# Patient Record
Sex: MALE | Race: WHITE | NOT HISPANIC OR LATINO | Employment: FULL TIME | URBAN - METROPOLITAN AREA
[De-identification: names, ages, dates, MRNs, and addresses within clinical notes are randomized per-mention and may not be internally consistent; named-entity substitution may affect disease eponyms.]

---

## 2024-10-03 ENCOUNTER — APPOINTMENT (OUTPATIENT)
Dept: ULTRASOUND IMAGING | Facility: HOSPITAL | Age: 44
End: 2024-10-03
Payer: COMMERCIAL

## 2024-10-03 ENCOUNTER — APPOINTMENT (OUTPATIENT)
Dept: GENERAL RADIOLOGY | Facility: HOSPITAL | Age: 44
End: 2024-10-03
Payer: COMMERCIAL

## 2024-10-03 ENCOUNTER — HOSPITAL ENCOUNTER (EMERGENCY)
Facility: HOSPITAL | Age: 44
Discharge: HOME | End: 2024-10-03
Attending: EMERGENCY MEDICINE
Payer: COMMERCIAL

## 2024-10-03 VITALS
TEMPERATURE: 98 F | RESPIRATION RATE: 20 BRPM | HEIGHT: 68 IN | WEIGHT: 184.6 LBS | OXYGEN SATURATION: 97 % | DIASTOLIC BLOOD PRESSURE: 92 MMHG | SYSTOLIC BLOOD PRESSURE: 140 MMHG | HEART RATE: 70 BPM | BODY MASS INDEX: 27.98 KG/M2

## 2024-10-03 DIAGNOSIS — M25.571 PAIN AND SWELLING OF RIGHT ANKLE: Primary | ICD-10-CM

## 2024-10-03 DIAGNOSIS — M25.471 PAIN AND SWELLING OF RIGHT ANKLE: Primary | ICD-10-CM

## 2024-10-03 PROCEDURE — 93971 EXTREMITY STUDY: CPT

## 2024-10-03 PROCEDURE — 99284 EMERGENCY DEPT VISIT MOD MDM: CPT

## 2024-10-03 PROCEDURE — 99283 EMERGENCY DEPT VISIT LOW MDM: CPT

## 2024-10-03 PROCEDURE — 73610 X-RAY EXAM OF ANKLE: CPT

## 2024-10-03 NOTE — Clinical Note
[facility] [place] [address] [place] [address] [place] [address]  Phone: [phone]    Salvador Ross was seen and treated in our emergency department on 10/3/2024.  He may return to work on 10/05/2024.         Thank you for choosing [facility].    Rema Morris APRN

## 2024-10-03 NOTE — FSED PROVIDER NOTE
[facility]    EMERGENCY [facility]PARTMENT ENCOUNTER    Room Number:  11/11  Date seen:  10/3/2024  Time seen: 16:46 EDT  PCP: Provider, No Known  Historian: patient    HPI:  Chief complaint:ankle swelling  Context:Salvador Ross is a 44 y.o. male who presents to the ED with c/o ankle swelling. The patient states that he has been having ankle pain and swelling since Tuesday. He states that the swelling as persisted and he was concerned about having a blood clot. The patient denies any numbness or tingling.  He reports that he did recently travel to [place] and back over the a couple of days.  He reports that one of his family members is a nurse practitioner and told him that he should be evaluated.  He denies any chest pain, shortness of breath.  Patient is able to ambulate without difficulty.    Timing: Constant  Duration: Tuesday  Location: Right ankle  Intensity/Severity: Moderate  Associated Symptoms: Right ankle pain and swelling    MEDICAL RECORD REVIEW  No chronic medical history reported    ALLERGIES  Patient has no known allergies.    PAST MEDICAL HISTORY  Active Ambulatory Problems     Diagnosis Date Noted    No Active Ambulatory Problems     Resolved Ambulatory Problems     Diagnosis Date Noted    No Resolved Ambulatory Problems     No Additional Past Medical History       PAST SURGICAL HISTORY  History reviewed. No pertinent surgical history.    FAMILY HISTORY  History reviewed. No pertinent family history.    SOCIAL HISTORY  Social History     Socioeconomic History    Marital status:        REVIEW OF SYSTEMS  Review of Systems    All systems reviewed and negative except for those discussed in HPI.     PHYSICAL EXAM    I have reviewed the triage vital signs and nursing notes.    ED Triage Vitals [10/03/24 1443]   Temp Heart Rate Resp BP SpO2   98 °F (36.7 °C) 85 18 (!) 167/102 97 %      Temp src Heart Rate Source Patient Position BP Location FiO2 (%)   -- -- -- --  --       Physical Exam  Constitutional:       Appearance: Normal appearance. He is not toxic-appearing.   HENT:      Nose: Nose normal.      Mouth/Throat:      Mouth: Mucous membranes are moist.   Cardiovascular:      Rate and Rhythm: Normal rate and regular rhythm.      Pulses: Normal pulses.      Heart sounds: Normal heart sounds.   Pulmonary:      Effort: Pulmonary effort is normal.      Breath sounds: Normal breath sounds.   Musculoskeletal:      Right ankle: Swelling present. Tenderness present. Normal range of motion. Normal pulse.      Right Achilles Tendon: Normal.      Right foot: Normal range of motion and normal capillary refill. Swelling present. No tenderness or bony tenderness. Normal pulse.        Legs:    Skin:     General: Skin is warm.      Capillary Refill: Capillary refill takes less than 2 seconds.   Neurological:      General: No focal deficit present.      Mental Status: He is alert.   Psychiatric:         Mood and Affect: Mood normal.         Behavior: Behavior normal.         Vital signs and nursing notes reviewed.        LAB RESULTS  No results found for this or any previous visit (from the past 24 hour(s)).    Ordered the above labs and independently reviewed the results.      RADIOLOGY RESULTS  US Venous Doppler Lower Extremity Right (duplex)    Result Date: 10/3/2024  US VENOUS DOPPLER LOWER EXTREMITY RIGHT (DUPLEX) Date of Exam: 10/3/2024 4:58 PM EDT Indication: left ankle swelling. Comparison: None available. Technique:  Routine gray scale, color flow and spectral Doppler analysis of the right lower extremity. A complete venous study was performed with image documentation obtained per protocol. Findings: Lower extremity deep veins including the common femoral vein, femoral vein, proximal deep femoral vein, popliteal vein, and visualized calf veins demonstrate normal flow, compressibility, and augmentation.   No evidence of luminal echogenic thrombus.     Impression: No evidence of  right lower extremity DVT Electronically Signed: Alber Lan MD  10/3/2024 5:02 PM EDT  Workstation ID: [id number]    XR Ankle 3+ View Right    Result Date: 10/3/2024  XR ANKLE 3+ VW RIGHT Date of Exam: 10/3/2024 3:13 PM EDT Indication: no trauma, increasing pain and swelling x2 days. Comparison: None available. Findings: There is no evidence of fracture or dislocation. No focal lesions identified. No erosions. Joint spaces are maintained. No focal soft tissue abnormalities identified.     Impression: No acute abnormality. Electronically Signed: Connor Godinez DO  10/3/2024 3:30 PM EDT  Workstation ID: [id number]        I ordered the above noted radiological studies. Independently reviewed by me and discussed with radiologist.  See dictation above for official radiology interpretation.      Orders placed during this visit:  Orders Placed This Encounter   Procedures    XR Ankle 3+ View Right    US Venous Doppler Lower Extremity Right (duplex)    Apply ace wrap    Recheck blood pressure  Nursing Communication    ED Acknowledgement Form Needed;           PROCEDURES    Procedures        MEDICATIONS GIVEN IN ER    Medications - No data to display      PROGRESS, DATA ANALYSIS, CONSULTS, AND MEDICAL DECISION MAKING    All labs and radiology studies have been independently reviewed by me.          AS OF 17:19 EDT VITALS:    BP - 140/92  HR - 70  TEMP - 98 °F (36.7 °C)  02 SATS - 97%    Medical Decision Making  MEDICAL DECISION  Patient is a 44-year-old male who presents today with right ankle pain and swelling.  DDX includes chronic venous stasis changes, lymphedema, fracture or trauma, MSK pain, DVT, cellulitis and other nonemergent causes of leg swelling. Doubt atypical presentation of CHF or other volume overload states. PE is low on the differential due to normal vital signs without symptoms. Low suspicion for constitutional infection or metabolic derangements. Patient was placed in an ace wrap and given discharge  instructions. Recommend follow up wit pcp and/or orthopedic. Return precautions given with understanding.      Problems Addressed:  Pain and swelling of right ankle: complicated acute illness or injury    Amount and/or Complexity of Data Reviewed  Radiology: ordered.          DIAGNOSIS  Final diagnoses:   Pain and swelling of right ankle       No orders of the defined types were placed in this encounter.          I performed hand hygiene on entry into the pt room and upon exit.      Part of this note may be an electronic transcription/translation of spoken language to printed text using the Dragon Dictation System.     Appropriate PPE worn during exam.    Dictated utilizing Dragon dictation     Note Disclaimer: At [facility], we believe that sharing information builds trust and better relationships. You are receiving this note because you recently visited [facility]. It is possible you will see health information before a provider has talked with you about it. This kind of information can be easy to misunderstand. To help you fully understand what it means for your health, we urge you to discuss this note with your provider.

## 2024-10-03 NOTE — DISCHARGE INSTRUCTIONS
Thank you for letting us care for you today. You can have Tylenol and Motrin as needed for pain. Rest, ice, and elevate. You can apply ice for 20 minutes at a time. Wear the ace wrap for comfort. Follow up with primary care or the orthopedic doctor listed below for continued evaluation. Return to the emergency room for any new or worsening symptoms.

## 2024-11-08 NOTE — PROGRESS NOTES
Chief Complaint  Chief Complaint   Patient presents with    Establish Care     Pt stated his blood pressure has been running high at home and dental appointments     Pain     Shoulder pain- right shoulder- can't lay on it at night not able to lift things    Mass     To the right of his lower spine - went to a chiropractor and didn't think was concerning but wanted it checked again         Subjective          Salvador Ross is here today to establish care and annual physical. The following problems were discussed:     Family history: Father- brain tumor, tumors in liver- cancer (passed away at 59). Maternal grandmother- emphysema, lung cancer, smoker. Maternal grandfather- brain tumor. Paternal grandmother- cancer. Sister- skin cancer- not melanoma.     Social history:Denies smoking, drinks 3 drinks a week, denies illegal drug use.     Right shoulder pain- He reports a couple of months ago he realize he had done something to his right shoulder. He has full range of motion. He reports when he lays on it and sometimes it hurts or picking up his children can hurt.     Lump on back- He reports in the past he went to chiropracter and told curvature of the spine. The chiropracter did notice a mass on his right side of the spine. He has had X-rays of his spine 5 years ago. He denies pain. He reports when he pushes on it that area of numbness.     Overweight- He is active, does not have set scheduled exercise. He tries to eat healthy.     Fatigue-He would like to get his testosterone checked. He has had hair thinning. He reports he has also notice curvature of his penis during ejaculation.       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   HENT:  Negative for congestion.    Eyes: Negative.    Respiratory:  Negative for shortness of breath.    Cardiovascular:  Negative for chest pain.   Gastrointestinal:  Negative for abdominal pain and nausea.   Genitourinary:  Positive for penile pain. Negative for difficulty urinating.   Musculoskeletal:  Negative for myalgias.        Right shoulder pain at times  Lump on back    Skin: Negative.    Neurological:  Negative for dizziness, light-headedness and headache.   Psychiatric/Behavioral:  Negative for self-injury, suicidal ideas and depressed mood. The patient is not nervous/anxious.         Objective   Vital Signs:   Vitals:    11/12/24 0924   BP: 116/88   Pulse:    Temp:    SpO2:       Estimated body mass index is 28.14 kg/m² as calculated from the following:    Height as of this encounter: 172.7 cm (67.99\").    Weight as of this encounter: 83.9 kg (185 lb).    BMI is >= 25 and <30. (Overweight) The following options were offered after discussion;: exercise counseling/recommendations and nutrition counseling/recommendations            Patient screened negative for depression based on a PHQ-9 score of 0 on 11/12/2024 . Follow-up recommendations include: PCP managing depression.        Physical Exam  Vitals reviewed.   Constitutional:       Appearance: Normal appearance. He is normal weight.   HENT:      Head: Normocephalic and atraumatic.      Right Ear: Tympanic membrane, ear canal and external ear normal.      Left Ear: Tympanic membrane, ear canal and external ear normal.      Nose: Nose normal.      Mouth/Throat:      Mouth: Mucous membranes are moist.      Pharynx: Oropharynx is clear.   Eyes:      Extraocular Movements: Extraocular movements intact.      Conjunctiva/sclera: Conjunctivae normal.      Comments: Wears glasses    Cardiovascular:      Rate and Rhythm: Normal rate and regular rhythm.      Pulses: Normal pulses.      Heart sounds: Normal heart " sounds.      Comments: S1, S2 audible  Pulmonary:      Effort: Pulmonary effort is normal.      Breath sounds: Normal breath sounds.      Comments: On room air   Abdominal:      General: Abdomen is flat. Bowel sounds are normal.      Palpations: Abdomen is soft.   Musculoskeletal:         General: Normal range of motion.      Cervical back: Normal range of motion and neck supple.   Skin:     General: Skin is warm and dry.   Neurological:      General: No focal deficit present.      Mental Status: He is alert and oriented to person, place, and time. Mental status is at baseline.   Psychiatric:         Mood and Affect: Mood normal.         Behavior: Behavior normal.         Thought Content: Thought content normal.         Judgment: Judgment normal.                Physical Exam   Result Review :             Procedures       Assessment and Plan     Diagnoses and all orders for this visit:    1. Acute pain of right shoulder (Primary)  Assessment & Plan:  Right shoulder pain- He reports a couple of months ago he realize he had done something to his right shoulder. He has full range of motion. He reports when he lays on it and sometimes it hurts or picking up his children can hurt.     Continue to monitor  Encourage ice and ibuprofen as needed    Consider X-ray or PT in future       2. 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 Maternal grandmother- emphysema, lung cancer, smoker. Maternal grandfather- brain tumor. Paternal grandmother- cancer. Sister- skin cancer- not melanoma.     Social history:Denies smoking, drinks 3 drinks a week, denies illegal drug use.       3. Overweight (BMI 25.0-29.9)  Assessment & Plan:  Patient's  (Body mass index is 28.14 kg/m².)     Encourage healthy diet and exercise     Overweight- He is active, does not have set scheduled exercise. He tries to eat healthy.       4. Encounter for annual physical exam  Assessment & Plan:  Family history: Father- brain tumor, tumors in liver- cancer (passed away at 59). Maternal grandmother- emphysema, lung cancer, smoker. Maternal grandfather- brain tumor. Paternal grandmother- cancer. Sister- skin cancer- not melanoma.     Social history:Denies smoking, drinks 3 drinks a week, denies illegal drug use.     Labs-Due  PSA- Due      Vaccines:  Flu-Refused  Covid-19-Refused    Dental exam-UTD  Eye exam-UTD    Encourage healthy diet and exercise  Encourage monthly self testicular exam  Check labs       Orders:  -     Comprehensive metabolic panel  -     Hemoglobin A1c  -     Hepatitis C Antibody  -     Lipid Panel    5. Other fatigue  Assessment & Plan:  Fatigue-He would like to get his testosterone checked. He has had hair thinning. He reports he has also notice curvature of his penis during ejaculation.     Check fatigue labs     Orders:  -     CBC and Differential  -     Comprehensive metabolic panel  -     Iron  -     Testosterone, free, total  -     Vitamin B12  -     Vitamin D 1,25 dihydroxy  -     TSH+Free T4    6. Screening PSA (prostate specific antigen)  -     PSA SCREENING    7. Lump of skin of back  Assessment & Plan:  Lump on back- He reports in the past he went to chiropracter and told curvature of the spine. The chiropracter did notice a mass on his right side of the spine. He has had X-rays of his spine 5 years ago. He denies pain. He reports when he pushes on it that area of numbness.     Unable to feel upon palpation    Encourage to watch and if grows in size or becomes more painful, follow-up                 Follow Up   Return in about 1 year (around 11/12/2025) for Annual physical.   Patient was given instructions and counseling regarding her condition or  for health maintenance advice. Please see specific information pulled into the AVS if appropriate.

## 2024-11-12 ENCOUNTER — OFFICE VISIT (OUTPATIENT)
Dept: FAMILY MEDICINE CLINIC | Facility: CLINIC | Age: 44
End: 2024-11-12
Payer: COMMERCIAL

## 2024-11-12 VITALS
TEMPERATURE: 98.4 F | HEIGHT: 68 IN | SYSTOLIC BLOOD PRESSURE: 116 MMHG | WEIGHT: 185 LBS | OXYGEN SATURATION: 97 % | BODY MASS INDEX: 28.04 KG/M2 | HEART RATE: 71 BPM | DIASTOLIC BLOOD PRESSURE: 88 MMHG

## 2024-11-12 DIAGNOSIS — Z76.89 ENCOUNTER TO ESTABLISH CARE: ICD-10-CM

## 2024-11-12 DIAGNOSIS — Z12.5 SCREENING PSA (PROSTATE SPECIFIC ANTIGEN): ICD-10-CM

## 2024-11-12 DIAGNOSIS — R53.83 OTHER FATIGUE: ICD-10-CM

## 2024-11-12 DIAGNOSIS — Z00.00 ENCOUNTER FOR ANNUAL PHYSICAL EXAM: ICD-10-CM

## 2024-11-12 DIAGNOSIS — M25.511 ACUTE PAIN OF RIGHT SHOULDER: Primary | ICD-10-CM

## 2024-11-12 DIAGNOSIS — E66.3 OVERWEIGHT (BMI 25.0-29.9): ICD-10-CM

## 2024-11-12 DIAGNOSIS — R22.2 LUMP OF SKIN OF BACK: ICD-10-CM

## 2024-11-12 LAB
T4 FREE SERPL-MCNC: 0.95 NG/DL (ref 0.93–1.7)
TSH SERPL DL<=0.05 MIU/L-ACNC: 1.45 UIU/ML (ref 0.27–4.2)

## 2024-11-12 PROCEDURE — 84402 ASSAY OF FREE TESTOSTERONE: CPT | Performed by: NURSE PRACTITIONER

## 2024-11-12 PROCEDURE — 82652 VIT D 1 25-DIHYDROXY: CPT | Performed by: NURSE PRACTITIONER

## 2024-11-12 PROCEDURE — G0103 PSA SCREENING: HCPCS | Performed by: NURSE PRACTITIONER

## 2024-11-12 PROCEDURE — 80061 LIPID PANEL: CPT | Performed by: NURSE PRACTITIONER

## 2024-11-12 PROCEDURE — 99213 OFFICE O/P EST LOW 20 MIN: CPT | Performed by: NURSE PRACTITIONER

## 2024-11-12 PROCEDURE — 84403 ASSAY OF TOTAL TESTOSTERONE: CPT | Performed by: NURSE PRACTITIONER

## 2024-11-12 PROCEDURE — 83036 HEMOGLOBIN GLYCOSYLATED A1C: CPT | Performed by: NURSE PRACTITIONER

## 2024-11-12 PROCEDURE — 84439 ASSAY OF FREE THYROXINE: CPT | Performed by: NURSE PRACTITIONER

## 2024-11-12 PROCEDURE — 80050 GENERAL HEALTH PANEL: CPT | Performed by: NURSE PRACTITIONER

## 2024-11-12 PROCEDURE — 99396 PREV VISIT EST AGE 40-64: CPT | Performed by: NURSE PRACTITIONER

## 2024-11-12 PROCEDURE — 83540 ASSAY OF IRON: CPT | Performed by: NURSE PRACTITIONER

## 2024-11-12 PROCEDURE — 86803 HEPATITIS C AB TEST: CPT | Performed by: NURSE PRACTITIONER

## 2024-11-12 PROCEDURE — 82607 VITAMIN B-12: CPT | Performed by: NURSE PRACTITIONER

## 2024-11-12 NOTE — ASSESSMENT & PLAN NOTE
Family history: Father- brain tumor, tumors in liver- cancer (passed away at 59). Maternal grandmother- emphysema, lung cancer, smoker. Maternal grandfather- brain tumor. Paternal grandmother- cancer. Sister- skin cancer- not melanoma.     Social history:Denies smoking, drinks 3 drinks a week, denies illegal drug use.     Labs-Due  PSA- Due      Vaccines:  Flu-Refused  Covid-19-Refused    Dental exam-UTD  Eye exam-UTD    Encourage healthy diet and exercise  Encourage monthly self testicular exam  Check labs

## 2024-11-12 NOTE — PROGRESS NOTES
Venipuncture performed in right arm with good hemostasis. Patient tolerated well. Aleksandra BUTCHER MA

## 2024-11-12 NOTE — ASSESSMENT & PLAN NOTE
Fatigue-He would like to get his testosterone checked. He has had hair thinning. He reports he has also notice curvature of his penis during ejaculation.     Check fatigue labs

## 2024-11-12 NOTE — ASSESSMENT & PLAN NOTE
Patient's (Body mass index is 28.14 kg/m².)     Encourage healthy diet and exercise     Overweight- He is active, does not have set scheduled exercise. He tries to eat healthy.

## 2024-11-12 NOTE — PATIENT INSTRUCTIONS
Encourage healthy diet and exercise  Encourage monthly self testicular exam  Check labs   Encourage to watch and if grows in size or becomes more painful, follow-up   Encourage ice and ibuprofen for right shoulder

## 2024-11-12 NOTE — ASSESSMENT & PLAN NOTE
Lump on back- He reports in the past he went to chiropracter and told curvature of the spine. The chiropracter did notice a mass on his right side of the spine. He has had X-rays of his spine 5 years ago. He denies pain. He reports when he pushes on it that area of numbness.     Unable to feel upon palpation    Encourage to watch and if grows in size or becomes more painful, follow-up

## 2024-11-12 NOTE — ASSESSMENT & PLAN NOTE
Right shoulder pain- He reports a couple of months ago he realize he had done something to his right shoulder. He has full range of motion. He reports when he lays on it and sometimes it hurts or picking up his children can hurt.     Continue to monitor  Encourage ice and ibuprofen as needed    Consider X-ray or PT in future

## 2024-11-12 NOTE — ASSESSMENT & PLAN NOTE
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 Maternal grandmother- emphysema, lung cancer, smoker. Maternal grandfather- brain tumor. Paternal grandmother- cancer. Sister- skin cancer- not melanoma.     Social history:Denies smoking, drinks 3 drinks a week, denies illegal drug use.

## 2024-11-13 LAB
ALBUMIN SERPL-MCNC: 4.6 G/DL (ref 3.5–5.2)
ALBUMIN/GLOB SERPL: 1.8 G/DL
ALP SERPL-CCNC: 87 U/L (ref 39–117)
ALT SERPL W P-5'-P-CCNC: 27 U/L (ref 1–41)
ANION GAP SERPL CALCULATED.3IONS-SCNC: 8.7 MMOL/L (ref 5–15)
AST SERPL-CCNC: 25 U/L (ref 1–40)
BASOPHILS # BLD AUTO: 0.03 10*3/MM3 (ref 0–0.2)
BASOPHILS NFR BLD AUTO: 0.4 % (ref 0–1.5)
BILIRUB SERPL-MCNC: 0.4 MG/DL (ref 0–1.2)
BUN SERPL-MCNC: 14 MG/DL (ref 6–20)
BUN/CREAT SERPL: 15.9 (ref 7–25)
CALCIUM SPEC-SCNC: 9.2 MG/DL (ref 8.6–10.5)
CHLORIDE SERPL-SCNC: 103 MMOL/L (ref 98–107)
CHOLEST SERPL-MCNC: 193 MG/DL (ref 0–200)
CO2 SERPL-SCNC: 26.3 MMOL/L (ref 22–29)
CREAT SERPL-MCNC: 0.88 MG/DL (ref 0.76–1.27)
DEPRECATED RDW RBC AUTO: 39.3 FL (ref 37–54)
EGFRCR SERPLBLD CKD-EPI 2021: 110.1 ML/MIN/1.73
EOSINOPHIL # BLD AUTO: 0.15 10*3/MM3 (ref 0–0.4)
EOSINOPHIL NFR BLD AUTO: 2 % (ref 0.3–6.2)
ERYTHROCYTE [DISTWIDTH] IN BLOOD BY AUTOMATED COUNT: 12.4 % (ref 12.3–15.4)
GLOBULIN UR ELPH-MCNC: 2.5 GM/DL
GLUCOSE SERPL-MCNC: 83 MG/DL (ref 65–99)
HBA1C MFR BLD: 5.1 % (ref 4.8–5.6)
HCT VFR BLD AUTO: 45.9 % (ref 37.5–51)
HCV AB SER QL: NORMAL
HDLC SERPL-MCNC: 40 MG/DL (ref 40–60)
HGB BLD-MCNC: 14.9 G/DL (ref 13–17.7)
IMM GRANULOCYTES # BLD AUTO: 0.02 10*3/MM3 (ref 0–0.05)
IMM GRANULOCYTES NFR BLD AUTO: 0.3 % (ref 0–0.5)
IRON 24H UR-MRATE: 123 MCG/DL (ref 59–158)
LDLC SERPL CALC-MCNC: 139 MG/DL (ref 0–100)
LDLC/HDLC SERPL: 3.45 {RATIO}
LYMPHOCYTES # BLD AUTO: 2.65 10*3/MM3 (ref 0.7–3.1)
LYMPHOCYTES NFR BLD AUTO: 36.1 % (ref 19.6–45.3)
MCH RBC QN AUTO: 28.2 PG (ref 26.6–33)
MCHC RBC AUTO-ENTMCNC: 32.5 G/DL (ref 31.5–35.7)
MCV RBC AUTO: 86.9 FL (ref 79–97)
MONOCYTES # BLD AUTO: 0.5 10*3/MM3 (ref 0.1–0.9)
MONOCYTES NFR BLD AUTO: 6.8 % (ref 5–12)
NEUTROPHILS NFR BLD AUTO: 4 10*3/MM3 (ref 1.7–7)
NEUTROPHILS NFR BLD AUTO: 54.4 % (ref 42.7–76)
NRBC BLD AUTO-RTO: 0 /100 WBC (ref 0–0.2)
PLATELET # BLD AUTO: 271 10*3/MM3 (ref 140–450)
PMV BLD AUTO: 10.5 FL (ref 6–12)
POTASSIUM SERPL-SCNC: 4 MMOL/L (ref 3.5–5.2)
PROT SERPL-MCNC: 7.1 G/DL (ref 6–8.5)
PSA SERPL-MCNC: 0.76 NG/ML (ref 0–4)
RBC # BLD AUTO: 5.28 10*6/MM3 (ref 4.14–5.8)
SODIUM SERPL-SCNC: 138 MMOL/L (ref 136–145)
TRIGL SERPL-MCNC: 75 MG/DL (ref 0–150)
VIT B12 BLD-MCNC: 386 PG/ML (ref 211–946)
VLDLC SERPL-MCNC: 14 MG/DL (ref 5–40)
WBC NRBC COR # BLD AUTO: 7.35 10*3/MM3 (ref 3.4–10.8)

## 2024-11-15 LAB — 1,25(OH)2D SERPL-MCNC: 50.1 PG/ML (ref 24.8–81.5)

## 2024-11-18 LAB
TESTOST FREE SERPL-MCNC: 6 PG/ML (ref 6.8–21.5)
TESTOST SERPL-MCNC: 316 NG/DL (ref 264–916)